# Patient Record
Sex: FEMALE | Race: WHITE | NOT HISPANIC OR LATINO | Employment: STUDENT | URBAN - METROPOLITAN AREA
[De-identification: names, ages, dates, MRNs, and addresses within clinical notes are randomized per-mention and may not be internally consistent; named-entity substitution may affect disease eponyms.]

---

## 2020-09-22 ENCOUNTER — TRANSFERRED RECORDS (OUTPATIENT)
Dept: HEALTH INFORMATION MANAGEMENT | Facility: CLINIC | Age: 35
End: 2020-09-22

## 2020-09-30 ENCOUNTER — TRANSFERRED RECORDS (OUTPATIENT)
Dept: HEALTH INFORMATION MANAGEMENT | Facility: CLINIC | Age: 35
End: 2020-09-30

## 2021-07-19 ENCOUNTER — MEDICAL CORRESPONDENCE (OUTPATIENT)
Dept: HEALTH INFORMATION MANAGEMENT | Facility: CLINIC | Age: 36
End: 2021-07-19

## 2021-07-19 ENCOUNTER — TRANSFERRED RECORDS (OUTPATIENT)
Dept: HEALTH INFORMATION MANAGEMENT | Facility: CLINIC | Age: 36
End: 2021-07-19

## 2021-07-20 ENCOUNTER — TRANSCRIBE ORDERS (OUTPATIENT)
Dept: OTHER | Age: 36
End: 2021-07-20

## 2021-07-20 DIAGNOSIS — K52.9 CHRONIC DIARRHEA: Primary | ICD-10-CM

## 2021-07-23 NOTE — TELEPHONE ENCOUNTER
REFERRAL INFORMATION:    Referring Provider:  JUNO Mello CNP     Referring Clinic:  [facility]     Reason for Visit/Diagnosis: Chronic diarrhea     FUTURE VISIT INFORMATION:    Appointment Date: 10/27/2021    Appointment Time: 8 AM      NOTES STATUS DETAILS   OFFICE NOTE from Referring Provider Received  9/17/2020 Office visit with Dr. Bea Waggoner (scanned [facility] 7/19/2021)     OFFICE NOTE from Other Specialist Received  9/30/2020 Office visit with RENU Marcano ([facility])   HOSPITAL DISCHARGE SUMMARY/  ED VISITS N/A    OPERATIVE REPORT N/A    MEDICATION LIST N/A         ENDOSCOPY  N/A    COLONOSCOPY N/A    ERCP N/A    EUS N/A    STOOL TESTING N/A      PERTINENT LABS Care Everywhere    PATHOLOGY REPORTS (RELATED) N/A    IMAGING (CT, MRI, EGD, MRCP, Small Bowel Follow Through/SBT, MR/CT Enterography) N/A

## 2021-10-27 ENCOUNTER — OFFICE VISIT (OUTPATIENT)
Dept: GASTROENTEROLOGY | Facility: CLINIC | Age: 36
End: 2021-10-27
Attending: NURSE PRACTITIONER
Payer: COMMERCIAL

## 2021-10-27 ENCOUNTER — PRE VISIT (OUTPATIENT)
Dept: GASTROENTEROLOGY | Facility: CLINIC | Age: 36
End: 2021-10-27

## 2021-10-27 VITALS
BODY MASS INDEX: 27.72 KG/M2 | SYSTOLIC BLOOD PRESSURE: 131 MMHG | HEART RATE: 68 BPM | OXYGEN SATURATION: 96 % | HEIGHT: 65 IN | WEIGHT: 166.4 LBS | DIASTOLIC BLOOD PRESSURE: 80 MMHG

## 2021-10-27 DIAGNOSIS — R12 HEARTBURN: ICD-10-CM

## 2021-10-27 DIAGNOSIS — K62.5 BRBPR (BRIGHT RED BLOOD PER RECTUM): ICD-10-CM

## 2021-10-27 DIAGNOSIS — R19.7 DIARRHEA, UNSPECIFIED TYPE: Primary | ICD-10-CM

## 2021-10-27 DIAGNOSIS — R63.4 WEIGHT LOSS: ICD-10-CM

## 2021-10-27 DIAGNOSIS — R13.19 ESOPHAGEAL DYSPHAGIA: ICD-10-CM

## 2021-10-27 PROCEDURE — 99205 OFFICE O/P NEW HI 60 MIN: CPT | Performed by: INTERNAL MEDICINE

## 2021-10-27 RX ORDER — FLUOXETINE 20 MG/1
30 TABLET, FILM COATED ORAL DAILY
COMMUNITY
Start: 2021-10-13 | End: 2022-11-09

## 2021-10-27 ASSESSMENT — MIFFLIN-ST. JEOR: SCORE: 1445.67

## 2021-10-27 NOTE — PROGRESS NOTES
GI CLINIC VISIT    CC/REFERRING MD:  Yara Barrera  REASON FOR CONSULTATION:   Ms. Grayson is a 36 year old female who I was asked to see in consultation at the request of Dr. Yara Barrera for   Chief Complaint   Patient presents with     New Patient       ASSESSMENT/PLAN:  (R19.7) Diarrhea, unspecified type  (primary encounter diagnosis)  (R63.4) Weight loss  (K62.5) BRBPR (bright red blood per rectum)  (R13.19) Esophageal dysphagia  (R12) Heartburn    New onset heartburn and solid food dysphagia in the setting of recent weight loss she does warrant endoscopic evaluation.  Would plan for biopsies for EOE.  Unclear if patient has had prior biopsies for celiac disease though recent serologies and reported past serologies have been negative.    Patient with concern for chronic diarrhea.  Consistency is not technically out of the normal realm the patient is having days of increased BMs (about 50% of days up to 3-4 BMs) as well as frequent urgency.  Also noting a vague sense of incomplete evacuation (in abdomen and rectum) does seem to resolve after multiple BMs later in the day.  Patient did have a 10 pound weight loss over 2 months though has stabilized over the past few weeks.  Had some bright red blood per rectum a few months ago which she felt was likely due to hemorrhoids given her past history of these.  Patient has increased fiber of this has not completely resolved her symptoms.  Plan for colonoscopy as well with with random colon biopsies for microscopic colitis in the setting of fluoxetine use.     In the meantime would continue with increased fiber intake-dietary and Metamucil.  Patient is interested in the with GI to dietitian be useful to discuss common food triggers for diarrhea such as caffeine and artificial and alcohol sugars.  Patient is also interested in discussion of low FODMAP diet in the setting of possible IBS/past diagnosis IBS.  Pending the above testing and clinical  "picture may consider breath testing down the road for SIBO and/or evaluation of the pelvic floor if there are issues with true rectal evacuation.    - EGD for dysphagia, new heartburn and recent weight loss  - Colonoscopy for diarrhea (microscopic colitis biopsies - on fluoxetine) and recent weight loss, BRBPR  - increasing complex carbs in diet, considering increase in Metamucil - ok to take up to 3 times daily--> all for stool bulking  - referral placed for GI dietitian  - follow-up in GI clinic 6-12 weeks with Dr. Moss and ALEXANDRIA Carter       Thank you for this consultation. It was a pleasure to participate in the care of this patient; please contact us with any further questions.  A total of 55 face-to-face minutes was spent with this patient, >50% of which was counseling regarding the above delineated issues. An additional 32 minutes was spent on the date of the encounter doing chart review, documentation, and further activities as noted above.    Beatriz Moss MD   of Medicine  Division of Gastroenterology, Hepatology and Nutrition  [facility]    ---------------------------------------------------------------------------------------------------  HPI:   Ms. Grayson is a 36 year old female who is s/p LEEP for abnormal PAP and s/p two spontaneous vaginal deliveries with PMDD and lactose intolerance who presents to GI clinic for evaluation of diarrhea and other GI symptoms.       Ms. Grayson reports that she's had \"GI issues\" for years. She states that as a kid she experienced \"stabbing\" epigastric pain which was only relieved with emesis. She was evaluated starting in the 8th grade and into early high school. She recalls having blood work done including an evaluation for celiac disease (all reportedly normal), a barium swallow (reportedly no evidence of ulceration or other abnormalities), and EGD (reportedly normal). She states she was eventually diagnosed with lactose " "intolerance. Apparently with discontinuation of high-volume milk intake her symptoms improved. It took her some time in high school to fully understand her dairy limits, but overall she felt much better in the ensuing years.     Years later she joined . During this time she started on a paleo diet and states she \"never felt better\" in terms of her stomach pains and bowel pattern. In 2015 she was stationed in [facility], [place]   and had to go off her diet and eat what was in cafeteria (packaged and processed foods per her report). During this entire time she experienced frequent looser stools and weight loss. Eventually she transitioned to eating only MREs (ready meals/rations). After this she reported marked improvement in her diarrhea, stating that MRAs are apparently known for leading to constipation.     Upon her return to the  she was evaluated for her diarrhea through the . She reports seeing a gastroenterologist and undergoing colonoscopy. This revealed a \"benign polyp\". She also reports seeing and allergist and that no food allergies identified. She doesn't fully recall the course of her symptoms upon her return, but feels diarrhea resolved rather quickly.     Ms. Grayson felt her bowel pattern was pretty good for several years. During that time her typical bowel pattern was daily; stool was formed but not fully solid - \"peanut butter\" to \"soft serv\" consistency. She did have infrequent urgency. During this time she tried to limit how much she ate out and limited processed foods and prepared meals. When she maintained a diet high in fruits, vegetables, and protein and \"more natural\" meals she states she felt much better. She wonders if she has an issue with a food preservative.    She did well until late Aug/Sept 2020 when she had acute onset of nausea vomiting diarrhea. Her  had the same symptoms they attributed it to food poisoning versus gastroenteritis. Her  symptoms resolved quickly " "though her seem to persist prompting her to seek care at [facility]. Work-up there included a negative stool enteric panel, giardia, tissue transglutaminase, O&P x3, and normal CBC. She was then referred to [facility]. Based on her clinical history and some improvement in her symptoms she was felt to have post-infectious flare of IBS on possible background of IBS and no further work-up was pursued at that time. She was started Metamucil and dicyclomine with consideration of endoscopic evaluation if her symptoms progress. Ms. Cruz felt her symptoms did get better for while.    She also reports a past history of hemorrhoids during pregnancy experienced as pain, itching, and small BRBPR.     Currently she does think her hemorrhoids flared a few months ago as she had recurrent pain, itching, and BRBPR. She increased her Metamucil and this improved.     In regards to her current bowel pattern she is passing one BM daily, typically in the morning.  BM is \"soft serv/peanut butter\" consistency and she wonders if this is normal.  She is most concerned though about frequent urgency which resolves after defecation.  About 3 to 4 days each week, particularly when she is in a hurry and not able to sit in the bathroom in the morning, she feels her evacuation is incomplete.  She has difficulty fully characterizing this sensation describes a sense of something being there in both the abdomen and the rectum.  This will lead to her having additional BMs with urgency throughout the day (up to 3).  This concerned her enough to schedule a visit with [facility] but unfortunately there were issues with getting seen prompting her to reach out to [facility] and obtain a referral to our clinic.    For treatment she is back on Metamucil and using this twice a day and has found this helped to some degree.     She [facility] (she is almost done with time as student and so transitioning away from " Bev).  [facility] signature obtained during this clinic visit) TTG and total IgA were done and normal. TSH 0.82, normal CBC.      ROS:    GI ROS:  Dysphagia: some sense of solid food sticking about once a week  Odynophagia: none  GERD symptoms: occasional heartburn experiences as burning in the chest or liquid up to the level of the throat  Nausea/vomiting: rare nausea associated with heartburn, no vomiting  Hematemesis/melena/hematochezia: none currently  Baseline stools: see HPI  Eructation: no change from baseline  Flatus: no change from baseline  Abdominal pain: not really an issue currently  Weight loss: reports a 10 lbs weight loss over two months, but no weight loss in the last month  NSAIDs: none  Oral steroids: none     Fevers/chills: none  Headache: none  Vision changes: none  Chest pain/pressure: none  Dyspnea: none  Skin changes/rashes: none  Lymphadenopathy: none  Myalgias/Arthralgias: none  Anxiety/depression: marked anxiety at start of pandemic - now improved, has diagnosis of PMDD after 2nd pregnancy and put on fluoxetine, went off for a period of time earlier this year and so went back on and feeling better.           PERTINENT PAST MEDICAL HISTORY:  Lactose intolerance  Hemorrhoids  S/p LEEP (2012) for abnormal PAP  PMDD  S/p two vaginal deliveries      PREVIOUS SURGERIES:  None      ALLERGIES:   Allergies   Allergen Reactions     Clarithromycin Hives     Other reaction(s): Urticaria  15yo         PERTINENT MEDICATIONS:  Current Outpatient Medications   Medication     FLUoxetine 20 MG tablet     Probiotic Product (PROBIOTIC PO)     Psyllium (METAMUCIL PO)     No current facility-administered medications for this visit.       SOCIAL HISTORY:  Social History     Socioeconomic History     Marital status:      Spouse name: Not on file     Number of children: Not on file     Years of education: Not on file     Highest education level: Not on file   Occupational History      "Not on file   Tobacco Use     Smoking status: Never Smoker     Smokeless tobacco: Never Used   Substance and Sexual Activity     Alcohol use: Not on file     Drug use: Not on file     Sexual activity: Not on file   Other Topics Concern     Not on file   Social History Narrative     Not on file     Social Determinants of Health     Financial Resource Strain:      Difficulty of Paying Living Expenses:    Food Insecurity:      Worried About Running Out of Food in the Last Year:      Ran Out of Food in the Last Year:    Transportation Needs:      Lack of Transportation (Medical):      Lack of Transportation (Non-Medical):    Physical Activity:      Days of Exercise per Week:      Minutes of Exercise per Session:    Stress:      Feeling of Stress :    Social Connections:      Frequency of Communication with Friends and Family:      Frequency of Social Gatherings with Friends and Family:      Attends [employer] Services:      Active Member of Clubs or Organizations:      Attends Club or Organization Meetings:      Marital Status:    Intimate Partner Violence:      Fear of Current or Ex-Partner:      Emotionally Abused:      Physically Abused:      Sexually Abused:        FAMILY HISTORY:  Father: diabetes, type 1  Maternal side: many with anxiety, depression  Half-sibling: cognitive disability  Half-sibling: neurologic disorder    Denies any known family history of GI malignancies.   Denies any known family history of GI illnesses.    PHYSICAL EXAMINATION:  Vitals /80   Pulse 68   Ht 1.651 m (5' 5\")   Wt 75.5 kg (166 lb 6.4 oz)   SpO2 96%   BMI 27.69 kg/m     Wt   Wt Readings from Last 2 Encounters:   10/27/21 75.5 kg (166 lb 6.4 oz)   Gen: Pt sitting up on exam table in NAD, interactive and cooperative on exam  Eyes: sclerae anicteric, no injection  Cardiac: RRR, nl S1, S2, no peripheral edema  Resp: Clear bilaterally  GI: Normoactive BS, abd soft, flat, essentially nontender  Skin: Warm, dry, no jaundice, nails " appear healthy  Neuro: alert, oriented, answers questions appropriately

## 2021-10-27 NOTE — NURSING NOTE
"Chief Complaint   Patient presents with     New Patient       Vitals:    10/27/21 0750   BP: 131/80   Pulse: 68   SpO2: 96%   Weight: 75.5 kg (166 lb 6.4 oz)   Height: 1.651 m (5' 5\")       Body mass index is 27.69 kg/m .    Radha Stoll CMA    "

## 2021-10-27 NOTE — PATIENT INSTRUCTIONS
- EGD for dysphagia, new heartburn and recent weight loss  - Colonoscopy for diarrhea (microscopic colitis biopsies - on fluoxetine) and recent weight loss, BRBPR  - increasing complex carbs in diet, considering increase in Metamucil - ok to take up to 3 times daily--> all for stool bulking  - referral placed for GI dietitian  - consider breath testing for SIBO, pending clinical picture  - follow-up in GI clinic 6-12 weeks with Dr. Moss and ALEXANDRIA Carter      If you have any questions, please don't hesitate to contact me through our GI RN Clinic Coordinator, Sally Amin, at [phone].

## 2021-10-28 ENCOUNTER — TELEPHONE (OUTPATIENT)
Dept: GASTROENTEROLOGY | Facility: CLINIC | Age: 36
End: 2021-10-28

## 2021-10-28 DIAGNOSIS — Z11.59 ENCOUNTER FOR SCREENING FOR OTHER VIRAL DISEASES: ICD-10-CM

## 2021-10-28 NOTE — TELEPHONE ENCOUNTER
Screening Questions  1. Are you active on mychart? N    2. What insurance is in the chart? [employer]    2.  Ordering/Referring Provider: Isa    3. BMI 27.5    4. Do you have any Lung issues?  N If yes continue:   Do you use daily home oxygen? NA   Do you have Pulmonary Hypertension? NA   Do you have SEVERE asthma? NA    5. Have you had a heart, lung, or liver transplant? N    6. Are you currently on dialysis or have chronic kidney disease? N    7. Have you had a stroke or Transient ischemic attack (TIA) within 6 months? N    8. In the past 6 months, have you had any heart related issues including cardiomyopathy or heart attack? N      If yes, did it require cardiac stenting or other implantable device?N      9. Do you have any implantable devices in your body (pacemaker, defib, LVAD)? N    10. Do you take nitroglycerin? If yes, how often? N    11. Are you currently taking any blood thinners?N    12. Are you a diabetic? N    13. (Females) Are you currently pregnant? N  If yes, how many weeks?      15. Are you taking any prescription pain medications on a routine schedule? N If yes, MAC sedation.    16. Do you have any chemical dependencies such as alcohol, street drugs, or methadone? N If yes, MAC sedation.    17. Do you have any history of post-traumatic stress syndrome, severe anxiety or history of psychosis? Moderate anxiety-takes meds    18. Do you transfer independently? Yes    19.  Do you have any issues with constipation? N    20. Preferred Pharmacy for Pre Prescription [id number] [place] [id number] AT [address] [place] [id number]    Scheduling Details    Which Colonoscopy Prep was Sent?: Miralax  Procedure Scheduled: Colon/Egd  Provider/Surgeon: Isa  Date of Procedure: 12/2/21  Location: [facility]  Caller (Please ask for phone number if not scheduled by patient): Elma      Sedation Type: MAC  Conscious Sedation- Needs  for 6 hours after the procedure  MAC/General-Needs   for 24 hours after procedure    Pre-op Required at [facility], [place], [place] and OR for MAC sedation:   (if yes advise patient they will need a pre-op prior to procedure)      Is patient on blood thinners? -N (If yes- inform patient to follow up with PCP or provider for follow up instructions)     Informed patient they will need an adult  Yes  Cannot take any type of public or medical transportation alone    [facility] 11/29/21    Confirmed Nurse will call to complete assessment Yes    Additional comments:

## 2021-10-29 ENCOUNTER — TELEPHONE (OUTPATIENT)
Dept: GASTROENTEROLOGY | Facility: CLINIC | Age: 36
End: 2021-10-29

## 2021-10-29 NOTE — TELEPHONE ENCOUNTER
Called pt to schedule a follow up with Leah Lee or Isa in person or virtual around 12/22. Also a video new appt with Tish Villarreal for next avail.

## 2021-11-18 ENCOUNTER — TELEPHONE (OUTPATIENT)
Dept: GASTROENTEROLOGY | Facility: CLINIC | Age: 36
End: 2021-11-18
Payer: COMMERCIAL

## 2021-11-18 NOTE — TELEPHONE ENCOUNTER
Patient scheduled for colonoscopy/EGD on 12.2.2021.     Covid test scheduled: 11.29.2021    Arrival time: 0630    Facility location: [facility]    Sedation type: MAC    Indication for procedure: dysphagia, heartburn, BRBPR, wt loss, diarrhea    Referring Provider: Yara Barrera NP    Bowel prep recommendation: miralax and magnesium citrate prep    Pre visit planning completed.    Lindsey Robert RN

## 2021-11-19 NOTE — TELEPHONE ENCOUNTER
Attempted to contact patient regarding upcoming colonoscopy/EGD procedure on 12.2.2021 for pre assessment questions. No answer.     Left message to return call to [phone] #2      Lindsey Robert RN

## 2021-11-22 NOTE — TELEPHONE ENCOUNTER
Patient returned call.     Pre assessment questions completed for upcoming colonoscopy/EGD procedure scheduled on 12/2/21.     COVID test scheduled 11/29/21    Reviewed procedural arrival time 0630 and facility location [facility].    Designated  policy reviewed. Instructed to have someone stay 24 hours post procedure.     Bowel prep recommendation: Miralax/Magnesium citrate/Dulcolax     Reviewed Miralax prep instructions with patient. No fiber/iron supplements or foods that contain nuts/seeds 7 days prior to procedure.     Anticoagulation/blood thinners? no    Electronic implanted devices? no    Patient verbalized understanding and had no questions or concerns at this time.    Lyric Roberts RN

## 2021-11-29 ENCOUNTER — LAB (OUTPATIENT)
Dept: LAB | Facility: CLINIC | Age: 36
End: 2021-11-29
Payer: COMMERCIAL

## 2021-11-29 DIAGNOSIS — Z11.59 ENCOUNTER FOR SCREENING FOR OTHER VIRAL DISEASES: ICD-10-CM

## 2021-11-29 LAB — SARS-COV-2 RNA RESP QL NAA+PROBE: NEGATIVE

## 2021-11-29 PROCEDURE — 99000 SPECIMEN HANDLING OFFICE-LAB: CPT | Performed by: PATHOLOGY

## 2021-11-29 PROCEDURE — U0003 INFECTIOUS AGENT DETECTION BY NUCLEIC ACID (DNA OR RNA); SEVERE ACUTE RESPIRATORY SYNDROME CORONAVIRUS 2 (SARS-COV-2) (CORONAVIRUS DISEASE [COVID-19]), AMPLIFIED PROBE TECHNIQUE, MAKING USE OF HIGH THROUGHPUT TECHNOLOGIES AS DESCRIBED BY CMS-2020-01-R: HCPCS | Mod: 90 | Performed by: PATHOLOGY

## 2021-11-29 PROCEDURE — U0005 INFEC AGEN DETEC AMPLI PROBE: HCPCS | Mod: 90 | Performed by: PATHOLOGY

## 2021-12-02 ENCOUNTER — DOCUMENTATION ONLY (OUTPATIENT)
Dept: GASTROENTEROLOGY | Facility: OUTPATIENT CENTER | Age: 36
End: 2021-12-02
Payer: COMMERCIAL

## 2021-12-02 ENCOUNTER — LAB REQUISITION (OUTPATIENT)
Dept: LAB | Facility: CLINIC | Age: 36
End: 2021-12-02
Payer: COMMERCIAL

## 2021-12-02 ENCOUNTER — TRANSFERRED RECORDS (OUTPATIENT)
Dept: HEALTH INFORMATION MANAGEMENT | Facility: CLINIC | Age: 36
End: 2021-12-02
Payer: COMMERCIAL

## 2021-12-02 ENCOUNTER — APPOINTMENT (OUTPATIENT)
Dept: GASTROENTEROLOGY | Facility: OUTPATIENT CENTER | Age: 36
End: 2021-12-02
Payer: COMMERCIAL

## 2021-12-02 PROCEDURE — 88305 TISSUE EXAM BY PATHOLOGIST: CPT | Mod: TC,ORL | Performed by: INTERNAL MEDICINE

## 2021-12-02 PROCEDURE — 88305 TISSUE EXAM BY PATHOLOGIST: CPT | Mod: 26 | Performed by: PATHOLOGY

## 2021-12-06 LAB
PATH REPORT.COMMENTS IMP SPEC: NORMAL
PATH REPORT.COMMENTS IMP SPEC: NORMAL
PATH REPORT.FINAL DX SPEC: NORMAL
PATH REPORT.GROSS SPEC: NORMAL
PATH REPORT.MICROSCOPIC SPEC OTHER STN: NORMAL
PATH REPORT.RELEVANT HX SPEC: NORMAL
PHOTO IMAGE: NORMAL

## 2021-12-12 ENCOUNTER — HEALTH MAINTENANCE LETTER (OUTPATIENT)
Age: 36
End: 2021-12-12

## 2022-05-19 ENCOUNTER — OFFICE VISIT (OUTPATIENT)
Dept: URGENT CARE | Facility: URGENT CARE | Age: 37
End: 2022-05-19
Payer: COMMERCIAL

## 2022-05-19 VITALS
BODY MASS INDEX: 26.09 KG/M2 | DIASTOLIC BLOOD PRESSURE: 80 MMHG | RESPIRATION RATE: 18 BRPM | TEMPERATURE: 98.7 F | OXYGEN SATURATION: 98 % | SYSTOLIC BLOOD PRESSURE: 121 MMHG | HEART RATE: 83 BPM | WEIGHT: 156.8 LBS

## 2022-05-19 DIAGNOSIS — H65.191 OTHER ACUTE NONSUPPURATIVE OTITIS MEDIA OF RIGHT EAR, RECURRENCE NOT SPECIFIED: Primary | ICD-10-CM

## 2022-05-19 PROCEDURE — 99203 OFFICE O/P NEW LOW 30 MIN: CPT | Performed by: FAMILY MEDICINE

## 2022-05-19 RX ORDER — AMOXICILLIN 500 MG/1
1000 TABLET, FILM COATED ORAL 2 TIMES DAILY
Qty: 40 TABLET | Refills: 0 | Status: SHIPPED | OUTPATIENT
Start: 2022-05-19 | End: 2022-05-29

## 2022-05-19 NOTE — PROGRESS NOTES
Chief complaint: ear discomfort    Everyone has cold x 2 weeks  Symptoms seem to be improving   But still has some plugging discomfort of both ears    Here to have her daughter checked, she would like her ears checked as well  Fever: No  Tried over the counter medications without relief  No fevers or chills chest pain or shortness of breath   No rash  Ill-contacts: no known covid exposure    Patient took a covid test earlier and was negative     Because of persistent and worsening symptoms came in to be seen    Problem list and histories reviewed & adjusted, as indicated.  Additional history: as documented    Problem list, Medication list, Allergies, and Medical/Social/Surgical histories reviewed in [facility] and updated as appropriate.    ROS:  Constitutional, HEENT, cardiovascular, pulmonary, gi and gu systems are negative, except as otherwise noted.    OBJECTIVE:                                                    /80   Pulse 83   Temp 98.7  F (37.1  C) (Tympanic)   Resp 18   Wt 71.1 kg (156 lb 12.8 oz)   SpO2 98%   BMI 26.09 kg/m    Body mass index is 26.09 kg/m .  GENERAL: healthy, alert and no distress  EYES: pink palpebral conjunctiva, anicteric sclera, pupils equally reactive to light and accomodation, extraocular muscles intact full and equal.  ENT: midline nasal septum, mild nasal congestion   Left ear:no tragal tenderness, no mastoid tenderness normal tympaninc membrane   Right ear: no tragal tenderness, no mastoid tenderness dull and erythematous tympaninc membrane   NECK: no adenopathy, no asymmetry or  masses  RESP: lungs clear to auscultation - no rales, rhonchi or wheezes  CV: regular rate and rhythm, normal S1 S2, no S3 or S4, no murmur, click or rub, no peripheral edema and peripheral pulses strong  MS: no gross musculoskeletal defects noted, no edema  NEURO: Normal strength and tone, mentation intact and speech normal    Diagnostic Test Results:  No results found for this or any previous  visit (from the past 24 hour(s)).     ASSESSMENT/PLAN:                                                        ICD-10-CM    1. Other acute nonsuppurative otitis media of right ear, recurrence not specified  H65.191 amoxicillin (AMOXIL) 500 MG tablet       Prescribed with amoxicillin   Recommend follow up with primary care provider if no relief in 3 days, sooner if worse  Adverse reactions of medications discussed.  Over the counter medications discussed.   Aware to come back in if with worsening symptoms or if no relief despite treatment plan  Patient voiced understanding and had no further questions.     MD Dai Fulton MD  [facility] [place]       [facility]

## 2022-08-09 ENCOUNTER — OFFICE VISIT (OUTPATIENT)
Dept: URGENT CARE | Facility: URGENT CARE | Age: 37
End: 2022-08-09
Payer: COMMERCIAL

## 2022-08-09 VITALS
RESPIRATION RATE: 16 BRPM | TEMPERATURE: 97.9 F | HEART RATE: 84 BPM | OXYGEN SATURATION: 100 % | SYSTOLIC BLOOD PRESSURE: 122 MMHG | DIASTOLIC BLOOD PRESSURE: 83 MMHG

## 2022-08-09 DIAGNOSIS — R07.9 CHEST PAIN, UNSPECIFIED TYPE: Primary | ICD-10-CM

## 2022-08-09 DIAGNOSIS — U07.1 INFECTION DUE TO 2019 NOVEL CORONAVIRUS: ICD-10-CM

## 2022-08-09 DIAGNOSIS — J34.89 SINUS PRESSURE: ICD-10-CM

## 2022-08-09 PROCEDURE — 99214 OFFICE O/P EST MOD 30 MIN: CPT | Mod: CS | Performed by: NURSE PRACTITIONER

## 2022-08-09 PROCEDURE — 93000 ELECTROCARDIOGRAM COMPLETE: CPT | Performed by: NURSE PRACTITIONER

## 2022-08-09 ASSESSMENT — PAIN SCALES - GENERAL: PAINLEVEL: SEVERE PAIN (6)

## 2022-08-09 NOTE — PROGRESS NOTES
Assessment & Plan     Chest pain, unspecified type    - EKG 12-lead complete w/read - Clinics    Infection due to 2019 novel coronavirus    Sinus pressure      Reviewed EKG completed during visit showing rate 76 sinus rhythm, without obvious ST elevation or depression. Recommend further evaluation in emergency room for new onset left-sided chest pain with recent COVID infection for cardiac workup. Patient agreeable and declines ambulance and is discharged in stable condition.       Sanna Hernandez NP  [facility]        Laurie Wills is a 37 year old female who presents to clinic today for the following health issues:  Chief Complaint   Patient presents with     Urgent Care     Covid Concern     Per pt states she tested positive for Covid on 07/31/2022 home test the first two days she had sore throat and fever and today having facial pain states it feels burning, productive mucus      Patient presents for evaluation of sinus pain/pressure. Associated symptoms: headache, runny nose. Symptoms started 7/31/22 and have been waxing and waning. She developed left sided chest pain/heaviness which started this afternoon. Pain is moderate. Denies shortness of breath. She had sore throat, feeling warm, chills with COVID which resolved.  She tired tylenol and Mucinex which helps temporarily.     Problem list, Medication list, Allergies, and Medical history reviewed in EPIC.    ROS:  Review of systems negative except for noted above        Objective    /83   Pulse 84   Temp 97.9  F (36.6  C) (Tympanic)   Resp 16   SpO2 100%   Physical Exam  Constitutional:       General: She is not in acute distress.     Appearance: She is not toxic-appearing or diaphoretic.   HENT:      Head: Normocephalic and atraumatic.      Right Ear: Tympanic membrane, ear canal and external ear normal.      Left Ear: Tympanic membrane, ear canal and external ear normal.      Nose:      Right Sinus: Maxillary  sinus tenderness present. No frontal sinus tenderness.      Left Sinus: No maxillary sinus tenderness or frontal sinus tenderness.      Comments: Moderate nasal congestion     Mouth/Throat:      Mouth: Mucous membranes are moist.      Pharynx: Oropharynx is clear. No oropharyngeal exudate or posterior oropharyngeal erythema.   Eyes:      Conjunctiva/sclera: Conjunctivae normal.   Cardiovascular:      Rate and Rhythm: Normal rate and regular rhythm.      Heart sounds: Normal heart sounds.   Pulmonary:      Effort: Pulmonary effort is normal. No respiratory distress.      Breath sounds: Normal breath sounds. No wheezing, rhonchi or rales.   Chest:      Chest wall: No tenderness.   Skin:     General: Skin is warm and dry.   Neurological:      General: No focal deficit present.      Mental Status: She is alert and oriented to person, place, and time.      Sensory: No sensory deficit.          EKG completed during visit showing rate 76 sinus rhythm, without obvious ST elevation or depression.

## 2022-10-03 ENCOUNTER — HEALTH MAINTENANCE LETTER (OUTPATIENT)
Age: 37
End: 2022-10-03

## 2022-11-09 ENCOUNTER — OFFICE VISIT (OUTPATIENT)
Dept: URGENT CARE | Facility: URGENT CARE | Age: 37
End: 2022-11-09
Payer: COMMERCIAL

## 2022-11-09 VITALS
OXYGEN SATURATION: 97 % | WEIGHT: 154 LBS | HEART RATE: 96 BPM | DIASTOLIC BLOOD PRESSURE: 85 MMHG | SYSTOLIC BLOOD PRESSURE: 125 MMHG | BODY MASS INDEX: 25.63 KG/M2 | TEMPERATURE: 99.2 F

## 2022-11-09 DIAGNOSIS — R05.1 ACUTE COUGH: Primary | ICD-10-CM

## 2022-11-09 PROCEDURE — 99213 OFFICE O/P EST LOW 20 MIN: CPT | Performed by: INTERNAL MEDICINE

## 2022-11-09 RX ORDER — ALBUTEROL SULFATE 90 UG/1
2 AEROSOL, METERED RESPIRATORY (INHALATION) EVERY 4 HOURS PRN
Qty: 18 G | Refills: 0 | Status: SHIPPED | OUTPATIENT
Start: 2022-11-09

## 2022-11-09 RX ORDER — BENZONATATE 100 MG/1
100 CAPSULE ORAL 3 TIMES DAILY PRN
Qty: 30 CAPSULE | Refills: 0 | Status: SHIPPED | OUTPATIENT
Start: 2022-11-09

## 2022-11-09 RX ORDER — FLUOXETINE 20 MG/1
40 TABLET, FILM COATED ORAL DAILY
COMMUNITY
Start: 2022-08-30

## 2022-11-09 RX ORDER — BUSPIRONE HYDROCHLORIDE 5 MG/1
5 TABLET ORAL 3 TIMES DAILY
COMMUNITY
Start: 2022-08-30 | End: 2023-08-30

## 2022-11-09 NOTE — PROGRESS NOTES
SUBJECTIVE:  Elma Grayson is an 37 year old female who presents for cough and runny nose.  Started four days ago.  Some ear pain.  Mild sore throat. Eating less than usual.  Ibuprofen and tylenol and mucinex which help some.  Cough is not productive.  Has some chills and sweats.  Vomited once a couple days ago. Neg home covid test.    PMH:  Anxiety, depression, ibs, childhood asthma.  There is no problem list on file for this patient.    Social History     Socioeconomic History     Marital status:      Spouse name: None     Number of children: None     Years of education: None     Highest education level: None   Tobacco Use     Smoking status: Never     Smokeless tobacco: Never     No family history on file.    ALLERGIES:  Clarithromycin    Current Outpatient Medications   Medication     busPIRone (BUSPAR) 5 MG tablet     FLUoxetine 20 MG tablet     No current facility-administered medications for this visit.         ROS:  ROS is done and is negative for general/constitutional, eye, ENT, Respiratory, cardiovascular, GI, , Skin, musculoskeletal except as noted elsewhere.  All other review of systems negative except as noted elsewhere.      OBJECTIVE:  /85   Pulse 96   Temp 99.2  F (37.3  C) (Tympanic)   Wt 69.9 kg (154 lb)   LMP  (LMP Unknown)   SpO2 97%   BMI 25.63 kg/m    GENERAL APPEARANCE: Alert, in no acute distress  EYES: normal  EARS: External ears normal. Canals clear. TMs with mild effusions no erythema.  NOSE:mild clear discharge  OROPHARYNX:normal  NECK:No adenopathy,masses or thyromegaly  RESP:periodic cough, clear to auscultation  CV:regular rate and rhythm and no murmurs, clicks, or gallops  ABDOMEN: Abdomen soft, non-tender. BS normal. No masses, organomegaly  SKIN: no ulcers, lesions or rash  MUSCULOSKELETAL:Musculoskeletal normal      RESULTS  No results found for any visits on 11/09/22..  No results found for this or any previous visit (from the past 48  hour(s)).    ASSESSMENT/PLAN:    ASSESSMENT / PLAN:  (R05.1) Acute cough  (primary encounter diagnosis)  Comment: currently c/w viral etiology.  rxs for meds to help with cough sxs.  Plan: albuterol (PROAIR HFA/PROVENTIL HFA/VENTOLIN         HFA) 108 (90 Base) MCG/ACT inhaler, benzonatate        (TESSALON) 100 MG capsule        Reviewed medication instructions and side effects. Follow up if experiences side effects.. I reviewed supportive care, otc meds to use if needed, expected course, and signs of concern.  Follow up as needed or if she does not improve within 5 day(s) or if worsens in any way.  Reviewed red flag symptoms and is to go to the ER if experiences any of these.      See [facility] for orders, medications, letters, patient instructions    Sue Tavares M.D.

## 2022-12-02 ENCOUNTER — MYC MEDICAL ADVICE (OUTPATIENT)
Dept: GASTROENTEROLOGY | Facility: CLINIC | Age: 37
End: 2022-12-02

## 2022-12-07 NOTE — TELEPHONE ENCOUNTER
Called to remind patient of their upcoming appointment with our GI clinic, on Wednesday 12/14/2022 at 8:45AM with Dr. Moss. This appointment is scheduled as an in-person appt. Please arrive 15 minutes early to check in for your appointment. , if your appointment is virtual (video or telephone) you need to be in [place] for the visit. To reschedule or cancel patient to call [phone].    Valeria Leon MA

## 2022-12-14 ENCOUNTER — OFFICE VISIT (OUTPATIENT)
Dept: GASTROENTEROLOGY | Facility: CLINIC | Age: 37
End: 2022-12-14
Payer: COMMERCIAL

## 2022-12-14 VITALS
HEIGHT: 65 IN | DIASTOLIC BLOOD PRESSURE: 84 MMHG | WEIGHT: 160.7 LBS | OXYGEN SATURATION: 97 % | HEART RATE: 82 BPM | SYSTOLIC BLOOD PRESSURE: 126 MMHG | BODY MASS INDEX: 26.77 KG/M2

## 2022-12-14 DIAGNOSIS — K21.9 GASTROESOPHAGEAL REFLUX DISEASE WITHOUT ESOPHAGITIS: ICD-10-CM

## 2022-12-14 DIAGNOSIS — K58.0 IRRITABLE BOWEL SYNDROME WITH DIARRHEA: Primary | ICD-10-CM

## 2022-12-14 DIAGNOSIS — E73.9 LACTOSE INTOLERANCE: ICD-10-CM

## 2022-12-14 PROCEDURE — 99215 OFFICE O/P EST HI 40 MIN: CPT | Performed by: INTERNAL MEDICINE

## 2022-12-14 RX ORDER — FAMOTIDINE 20 MG/1
20 TABLET, FILM COATED ORAL 2 TIMES DAILY
Qty: 60 TABLET | Refills: 3 | Status: SHIPPED | OUTPATIENT
Start: 2022-12-14

## 2022-12-14 ASSESSMENT — PAIN SCALES - GENERAL: PAINLEVEL: NO PAIN (1)

## 2022-12-14 NOTE — PROGRESS NOTES
"Chief Complaint   Patient presents with     Follow Up       Vitals:    12/14/22 0859   BP: 126/84   BP Location: Left arm   Cuff Size: Adult Regular   Pulse: 82   SpO2: 97%   Weight: 72.9 kg (160 lb 11.2 oz)   Height: 1.651 m (5' 5\")       Body mass index is 26.74 kg/m .    Corby Marie  -----------------------------------    GI CLINIC VISIT    CC: follow-up      ASSESSMENT/PLAN:  (K58.0) Irritable bowel syndrome with diarrhea  (primary encounter diagnosis)  (K21.9) Gastroesophageal reflux disease without esophagitis  (E73.9) Lactose intolerance       Mild heartburn symptoms and sense of food sticking. Reported past hx of erosive esophagitis. Recent EGD endoscopically normal with only mild inflammation on distal esophagus biopsies. Discussed trial of H2 blocker. Med discussed.      Intermittent periods of loose stools with some symptom-free periods. Normal EGD and colon with normal biopsies. Normal TTG, CBC, TSH. Negative stool enteric panel, giardia, and O&P.  Known background of lactose intolerance with worsened symptoms with lactose-ingestion and with increased coffee intake. Certainly IBS can be playing a role as well. Pt does feel Buspar has been helpful particularly for keeping momentum for her to make dietary and lifestyle changes and keep up with appointments. Reviewed some adjustments to her meds and diet as outlined below as well as visit with our dietitian.     - please cut out all artificial sugars from your diet: Aspartame, Sucralose, Acesulfame K, Saccharin, Xylitol.  - continue to avoid lactose as you are  - consider having Lactaid with you and using before eating out or with [place]  - Increase Metamucil to daily use  - schedule a visit with our GI dietitian at your earliest convenience  - Increase Buspar to 10 mg twice daily, hopefully this will help with scheduling/remembering to take it during your busy day  - Consider taking loperamide/Immodium prophylactically if eating out, traveling, etc.  - " "consider cutting back on caffeine to 1-2 cups a day  - start famotidine 20 mg BID  - follow-up in 12-16 weeks    It was a pleasure to participate in the care of this patient; please contact us with any further questions.  A total of 35 face-to-face minutes was spent with this patient, >50% of which was counseling regarding the above delineated issues. An additional 30 minutes was spent on the date of the encounter doing chart review, documentation, care coordination, and further activities as noted above.    Beatriz Moss MD   of Medicine  Division of Gastroenterology, Hepatology and Nutrition  [facility]    ---------------------------------------------------------------------------------------------------  HPI:    Ms. Grayson is a 37 year old female who is s/p LEEP for abnormal PAP and s/p two spontaneous vaginal deliveries with PMDD and lactose intolerance who initially presented to GI clinic on 10/27/21 for evaluation of diarrhea and other GI symptoms. This is her first return visit.        ** Diarrhea  ** Lactose-intolerance  Taken/summarized from my earlier documentation:  Ms. Grayson reports that she's had \"GI issues\" for years. She states that as a kid she experienced \"stabbing\" epigastric pain which was only relieved with emesis. She was evaluated starting in the 8th grade and into early high school. She recalls having blood work done including an evaluation for celiac disease (all reportedly normal), a barium swallow (reportedly no evidence of ulceration or other abnormalities), and EGD. She states she was eventually diagnosed with lactose intolerance. Apparently with discontinuation of high-volume milk intake her symptoms improved. It took her some time in high school to fully understand her dairy limits, but overall she felt much better in the ensuing years.      Years later she joined . During this time she started on a paleo diet and states she \"never felt " "better\" in terms of her stomach pains and bowel pattern. In 2015 she was stationed in [facility] and had to go off her diet and eat what was in cafeteria (packaged and processed foods per her report). During this entire time she experienced frequent looser stools and weight loss. Eventually she transitioned to eating only MREs (ready meals/rations). After this she reported marked improvement in her diarrhea, stating that these meals are apparently known for leading to constipation.      Upon her return to the  she was evaluated for her diarrhea through the . She reports seeing a gastroenterologist and undergoing colonoscopy. This revealed a \"benign polyp\". She also reports seeing and allergist and that no food allergies identified. She doesn't fully recall the course of her symptoms upon her return, but feels diarrhea resolved rather quickly.      Ms. Grayson felt her bowel pattern was pretty good for several years. During that time her typical bowel pattern was daily; stool was formed but not fully solid - \"peanut butter\" to \"soft serv\" consistency. She did have infrequent urgency. During this time she tried to limit how much she ate out and limited processed foods and prepared meals. When she maintained a diet high in fruits, vegetables, and protein and \"more natural\" meals she states she felt much better. She wonders if she has an issue with a food preservative.    She did well until late Aug/Sept 2020 when she had acute onset of nausea, vomiting, and diarrhea. Her  had the same symptoms they attributed it to food poisoning versus gastroenteritis. Her  symptoms resolved quickly though her seem to persist prompting her to seek care at [facility]. Work-up there included a negative stool enteric panel, giardia, tissue transglutaminase, O&P x3, and normal CBC. She was then referred to [facility]. 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 She was started Metamucil and dicyclomine with consideration of endoscopic evaluation if her symptoms progress. Ms. Cruz felt her symptoms did get better for while.     At the time of her first visit in our clinic, her bowel pattern was one BM daily, typically in the morning.  BM was \"soft serv/peanut butter\" consistency and she wonders if this is normal.  She is most concerned though about frequent urgency which resolves after defecation.  About 3 to 4 days each week, particularly when she is in a hurry and not able to sit in the bathroom in the morning, she feels her evacuation is incomplete.  She has difficulty fully characterizing this sensation describes a sense of something being there in both the abdomen and the rectum.  This will lead to her having additional BMs with urgency throughout the day (up to 3).  This concerned her enough to schedule a visit with [facility] but unfortunately there were issues with getting seen prompting her to reach out to [facility] and obtain a referral to our clinic.     She re-initiated Metamucil BID at that time and found this helped to some degree. Outside labs done through her PCP at that time revealed normal TTG and total IgA. TSH 0.82, normal CBC.    After her last visit, we pursued an EGD and colonoscopy. EGD was endoscopically normal. Biopsies of the esophagus were NOT consistent with EoE, but mild inflammation was seen on the distal biopsies. Gastric biopsies were normal. Duodenal biopsies had some peptic duodenitis, but no evidence of celiac disease. Colonoscopy to the  was normal. Random right and left colon biopsies were normal.     At that time, Ms. Grayson reports she was busy and not able to follow-up or meet with our GI dietitian. Overall she felt her stool patterns was pretty good for awhile. Unfortunately things worsened and she was worried about how much she was " missing work to use the bathroom. Stools were Don[place]han 5-6 with urgency, going 8-12 times a day.  These 8-12 stools were passed as three or four clusters of a few BMs at a time. She made plans to follow-up in our clinic and was seen by her PCP.      She maintained daily Metamucil intake and abstained from coffee ingestion. Overtime she made other adjustment including drinking Kombucha. By Aug 2022 her PCP had started her on Buspar 5 mg TID (primarily for GI symptoms, but also noting impact on anxiousness). Ms. Grayson improved and was only having 1-2 BMs a day which were Doniphan 4 during this time. She was not taking any antidiarrheals.     After feeling well for a while, she re-introduced coffee, and BMs increased - up to 4-5 times a day. She has less urgency than at her worst, though stool is now Doniphan 5ish. She has noted some triggers, for example, if she has Morovis coffee drink and a breakfast sandwich she will have more symptoms, than if had neither or just the coffee. She also adds that she and her  are cooking more at home and eating less packaged food.     Diet Recall for common diarrheal triggers:  Caffeine: when off of coffee was on energy drinks, when feeling well would have up to a pot of coffee a day, now is having about a half a pot of coffee, 1-2 times a week will get Morovis  Artificial sugars/alcohol sugars: doesn't think uses much but hasn't checked labels at home, uses honey to briana coffee  Alcohol: none  Lactose: known intolerance to lactose since childhood, limited ingestion, generally hard cheeses for dairy, does not use Lactaid (has in the past), when doing well/feeling well - she will allow herself some lactose (I.e ice cream)    Current meds:  - Metamucil dose - no longer using daily, maybe 1-2 times a week. 2 tablespoons at a time  -Buspar - currently planned to stay at that dosing, feels really helpful for anxiety. On 5 mg TID - per her intended for GI symptoms. Does have a  "harder time taking it TID. Still on fluoxetine  - Loperamide, restarted after reintroduced coffee and stools got looser. Maybe using once a week. Will take 2 tabs, if still loose stools will another one.       **BRBPR  Taken/summarized from my earlier documentation:  She reports a past history of hemorrhoids during pregnancy experienced as pain, itching, and small BRBPR. At her initial GI visit she had reported more recent perianal pain, itching, and intermittent BRBPR. She felt this may be related to hemorrhoids and increased Metamucil with improvement.     As noted above, a colonoscopy was undertaken in Dec 2021. No source of BRBPR was seen on the exam.      Today, this is not an acute issue and she hasn't had these symptoms for some time.     ** Concern for GERD  At her initial GI visit, Ms. Grayson had described a new onset of solid food dysphagia and heartburn. EGD in Dec 2021 was endoscopically normal. Biopsies were NOT consistent with eosinophilic esophagitis, though a small amount of inflammation was seen on the distal esophagus biopsies suggestive of mild reflux.     Today, Ms. Grayson feels this is doing ok. She still has some periods of heartburn, but this hasn't been a significant issue. She continues to note a mild sense of food sticking and wonders if this is mostly sensed when she is paying attention to her swallow or eating too fast. She hasn't been taking anything for heartburn on a regular basis. She recalls a finding of \"erosive esophagitis\" on an EGD done years ago.    GI ROS:  No overt GI bleeding, weight is stable - no weight loss.     PERTINENT MEDICATIONS:  Current Outpatient Medications   Medication     albuterol (PROAIR HFA/PROVENTIL HFA/VENTOLIN HFA) 108 (90 Base) MCG/ACT inhaler     benzonatate (TESSALON) 100 MG capsule     busPIRone (BUSPAR) 5 MG tablet     FLUoxetine 20 MG tablet     No current facility-administered medications for this visit.         PHYSICAL EXAMINATION:  Vitals BP " "126/84 (BP Location: Left arm, Cuff Size: Adult Regular)   Pulse 82   Ht 1.651 m (5' 5\")   Wt 72.9 kg (160 lb 11.2 oz)   LMP  (LMP Unknown)   SpO2 97%   BMI 26.74 kg/m     Wt   Wt Readings from Last 2 Encounters:   12/14/22 72.9 kg (160 lb 11.2 oz)   11/09/22 69.9 kg (154 lb)      Gen: Pt sitting up on exam table in NAD, interactive and cooperative on exam  Eyes: sclerae anicteric, no injection  Cardiac: RRR, nl S1, S2, no peripheral edema  Resp: Clear b/l  GI: Normoactive BS, abd soft, flat, nontender  Skin: Warm, dry, no jaundice, nails appear healthy  Neuro: alert, oriented, answers questions appropriately      PERTINENT STUDIES:    Lab Requisition on 12/02/2021   Component Date Value Ref Range Status     Case Report 12/02/2021    Final                    Value:Surgical Pathology Report                         Case: [id number]                                  Authorizing Provider:  Beatriz Moss         Collected:           12/02/2021 07:30 AM                                 MD Vianey                                                                Ordering Location:     [facility]     Received:            12/02/2021 02:41 PM                                 [facility]                                                       Pathologist:           Tyrel Domingo MD                                                             Specimens:   A) - Small Intestine, Duodenum, Duodenal biopsy                                                     B) - Stomach, Gastric biopsy                                                                        C) - Esophagus, Distal, Esophageal biopsy                                                           D) - Esophagus, Mid, Esophageal biopsy                                                                                        E) - Large Intestine, Colon, Ascending, Colon biopsy, ascending                                     F) - Large Intestine, Colon, " Descending, Colon biopsy, descending                           Final Diagnosis 12/02/2021    Final                    Value:This result contains rich text formatting which cannot be displayed here.     Clinical Information 12/02/2021    Final                    Value:This result contains rich text formatting which cannot be displayed here.     Gross Description 12/02/2021    Final                    Value:This result contains rich text formatting which cannot be displayed here.     Microscopic Description 12/02/2021    Final                    Value:This result contains rich text formatting which cannot be displayed here.     Performing Labs 12/02/2021    Final                    Value:This result contains rich text formatting which cannot be displayed here.

## 2022-12-14 NOTE — LETTER
"    12/14/2022         RE: Elma Grayson  [address] [place] [address]        Dear Colleague,    Thank you for referring your patient, Elma Grayson, to the [facility] [place]. Please see a copy of my visit note below.    Chief Complaint   Patient presents with     Follow Up       Vitals:    12/14/22 0859   BP: 126/84   BP Location: Left arm   Cuff Size: Adult Regular   Pulse: 82   SpO2: 97%   Weight: 72.9 kg (160 lb 11.2 oz)   Height: 1.651 m (5' 5\")       Body mass index is 26.74 kg/m .    Corby Marie  -----------------------------------    GI CLINIC VISIT    CC: follow-up      ASSESSMENT/PLAN:  (K58.0) Irritable bowel syndrome with diarrhea  (primary encounter diagnosis)  (K21.9) Gastroesophageal reflux disease without esophagitis  (E73.9) Lactose intolerance       Mild heartburn symptoms and sense of food sticking. Reported past hx of erosive esophagitis. Recent EGD endoscopically normal with only mild inflammation on distal esophagus biopsies. Discussed trial of H2 blocker. Med discussed.      Intermittent periods of loose stools with some symptom-free periods. Normal EGD and colon with normal biopsies. Normal TTG, CBC, TSH. Negative stool enteric panel, giardia, and O&P.  Known background of lactose intolerance with worsened symptoms with lactose-ingestion and with increased coffee intake. Certainly IBS can be playing a role as well. Pt does feel Buspar has been helpful particularly for keeping momentum for her to make dietary and lifestyle changes and keep up with appointments. Reviewed some adjustments to her meds and diet as outlined below as well as visit with our dietitian.     - please cut out all artificial sugars from your diet: Aspartame, Sucralose, Acesulfame K, Saccharin, Xylitol.  - continue to avoid lactose as you are  - consider having Lactaid with you and using before eating out or with [place]  - Increase Metamucil to daily use  - schedule a visit with " "our GI dietitian at your earliest convenience  - Increase Buspar to 10 mg twice daily, hopefully this will help with scheduling/remembering to take it during your busy day  - Consider taking loperamide/Immodium prophylactically if eating out, traveling, etc.  - consider cutting back on caffeine to 1-2 cups a day  - start famotidine 20 mg BID  - follow-up in 12-16 weeks    It was a pleasure to participate in the care of this patient; please contact us with any further questions.  A total of 35 face-to-face minutes was spent with this patient, >50% of which was counseling regarding the above delineated issues. An additional 30 minutes was spent on the date of the encounter doing chart review, documentation, care coordination, and further activities as noted above.    Beatriz Moss MD   of Medicine  Division of Gastroenterology, Hepatology and Nutrition  [facility]    ---------------------------------------------------------------------------------------------------  HPI:    Ms. Grayson is a 37 year old female who is s/p LEEP for abnormal PAP and s/p two spontaneous vaginal deliveries with PMDD and lactose intolerance who initially presented to GI clinic on 10/27/21 for evaluation of diarrhea and other GI symptoms. This is her first return visit.        ** Diarrhea  ** Lactose-intolerance  Taken/summarized from my earlier documentation:  Ms. Grayson reports that she's had \"GI issues\" for years. She states that as a kid she experienced \"stabbing\" epigastric pain which was only relieved with emesis. She was evaluated starting in the 8th grade and into early high school. She recalls having blood work done including an evaluation for celiac disease (all reportedly normal), a barium swallow (reportedly no evidence of ulceration or other abnormalities), and EGD. She states she was eventually diagnosed with lactose intolerance. Apparently with discontinuation of high-volume milk intake her " "symptoms improved. It took her some time in high school to fully understand her dairy limits, but overall she felt much better in the ensuing years.      Years later she joined . During this time she started on a paleo diet and states she \"never felt better\" in terms of her stomach pains and bowel pattern. In 2015 she was stationed in [facility] and had to go off her diet and eat what was in cafeteria (packaged and processed foods per her report). During this entire time she experienced frequent looser stools and weight loss. Eventually she transitioned to eating only MREs (ready meals/rations). After this she reported marked improvement in her diarrhea, stating that these meals are apparently known for leading to constipation.      [place] to the [place] she was evaluated for her diarrhea through the . She reports seeing a gastroenterologist and undergoing colonoscopy. This revealed a \"benign polyp\". She also reports seeing and allergist and that no food allergies identified. She doesn't fully recall the course of her symptoms upon her return, but feels diarrhea resolved rather quickly.      Ms. Grayson felt her bowel pattern was pretty good for several years. During that time her typical bowel pattern was daily; stool was formed but not fully solid - \"peanut butter\" to \"soft serv\" consistency. She did have infrequent urgency. During this time she tried to limit how much she ate out and limited processed foods and prepared meals. When she maintained a diet high in fruits, vegetables, and protein and \"more natural\" meals she states she felt much better. She wonders if she has an issue with a food preservative.    She did well until late Aug/Sept 2020 when she had acute onset of nausea, vomiting, and diarrhea. Her  had the same symptoms they attributed it to food poisoning versus gastroenteritis. Her  symptoms resolved quickly though her seem to persist prompting her to seek care at " "[facility]. Work-up there included a negative stool enteric panel, giardia, tissue transglutaminase, O&P x3, and normal CBC. She was then referred to [facility]. Based on her clinical history and some improvement in her symptoms she was felt to have post-infectious flare of IBS on possible background of IBS and no further work-up was pursued at that time. She was started Metamucil and dicyclomine with consideration of endoscopic evaluation if her symptoms progress. Ms. Cruz felt her symptoms did get better for while.     At the time of her first visit in our clinic, her bowel pattern was one BM daily, typically in the morning.  BM was \"soft serv/peanut butter\" consistency and she wonders if this is normal.  She is most concerned though about frequent urgency which resolves after defecation.  About 3 to 4 days each week, particularly when she is in a hurry and not able to sit in the bathroom in the morning, she feels her evacuation is incomplete.  She has difficulty fully characterizing this sensation describes a sense of something being there in both the abdomen and the rectum.  This will lead to her having additional BMs with urgency throughout the day (up to 3).  This concerned her enough to schedule a visit with [facility] but unfortunately there were issues with getting seen prompting her to reach out to [facility] and obtain a referral to our clinic.     She re-initiated Metamucil BID at that time and found this helped to some degree. Outside labs done through her PCP at that time revealed normal TTG and total IgA. TSH 0.82, normal CBC.    After her last visit, we pursued an EGD and colonoscopy. EGD was endoscopically normal. Biopsies of the esophagus were NOT consistent with EoE, but mild inflammation was seen on the distal biopsies. Gastric biopsies were normal. Duodenal biopsies had some peptic duodenitis, but no evidence of celiac disease. Colonoscopy to the  was normal. Random right and left colon " biopsies were normal.     At that time, Ms. Grayson reports she was busy and not able to follow-up or meet with our GI dietitian. Overall she felt her stool patterns was pretty good for awhile. Unfortunately things worsened and she was worried about how much she was missing work to use the bathroom. Stools were [place] 5-6 with urgency, going 8-12 times a day.  These 8-12 stools were passed as three or four clusters of a few BMs at a time. She made plans to follow-up in our clinic and was seen by her PCP.      She maintained daily Metamucil intake and abstained from coffee ingestion. Overtime she made other adjustment including drinking Kombucha. By Aug 2022 her PCP had started her on Buspar 5 mg TID (primarily for GI symptoms, but also noting impact on anxiousness). Ms. Grayson improved and was only having 1-2 BMs a day which were [place] 4 during this time. She was not taking any antidiarrheals.     After feeling well for a while, she re-introduced coffee, and BMs increased - up to 4-5 times a day. She has less urgency than at her worst, though stool is now [place] 5ish. She has noted some triggers, for example, if she has [place] coffee drink and a breakfast sandwich she will have more symptoms, than if had neither or just the coffee. She also adds that she and her  are cooking more at home and eating less packaged food.     Diet Recall for common diarrheal triggers:  Caffeine: when off of coffee was on energy drinks, when feeling well would have up to a pot of coffee a day, now is having about a half a pot of coffee, 1-2 times a week will get [place]  Artificial sugars/alcohol sugars: doesn't think uses much but hasn't checked labels at home, uses honey to briana coffee  Alcohol: none  Lactose: known intolerance to lactose since childhood, limited ingestion, generally hard cheeses for dairy, does not use Lactaid (has in the past), when doing well/feeling well - she will allow herself some lactose (I.e ice  "cream)    Current meds:  - Metamucil dose - no longer using daily, maybe 1-2 times a week. 2 tablespoons at a time  -Buspar - currently planned to stay at that dosing, feels really helpful for anxiety. On 5 mg TID - per her intended for GI symptoms. Does have a harder time taking it TID. Still on fluoxetine  - Loperamide, restarted after reintroduced coffee and stools got looser. Maybe using once a week. Will take 2 tabs, if still loose stools will another one.       **BRBPR  Taken/summarized from my earlier documentation:  She reports a past history of hemorrhoids during pregnancy experienced as pain, itching, and small BRBPR. At her initial GI visit she had reported more recent perianal pain, itching, and intermittent BRBPR. She felt this may be related to hemorrhoids and increased Metamucil with improvement.     As noted above, a colonoscopy was undertaken in Dec 2021. No source of BRBPR was seen on the exam.      Today, this is not an acute issue and she hasn't had these symptoms for some time.     ** Concern for GERD  At her initial GI visit, Ms. Grayson had described a new onset of solid food dysphagia and heartburn. EGD in Dec 2021 was endoscopically normal. Biopsies were NOT consistent with eosinophilic esophagitis, though a small amount of inflammation was seen on the distal esophagus biopsies suggestive of mild reflux.     Today, Ms. Grayson feels this is doing ok. She still has some periods of heartburn, but this hasn't been a significant issue. She continues to note a mild sense of food sticking and wonders if this is mostly sensed when she is paying attention to her swallow or eating too fast. She hasn't been taking anything for heartburn on a regular basis. She recalls a finding of \"erosive esophagitis\" on an EGD done years ago.    GI ROS:  No overt GI bleeding, weight is stable - no weight loss.     PERTINENT MEDICATIONS:  Current Outpatient Medications   Medication     albuterol (PROAIR HFA/PROVENTIL " "HFA/VENTOLIN HFA) 108 (90 Base) MCG/ACT inhaler     benzonatate (TESSALON) 100 MG capsule     busPIRone (BUSPAR) 5 MG tablet     FLUoxetine 20 MG tablet     No current facility-administered medications for this visit.         PHYSICAL EXAMINATION:  Vitals /84 (BP Location: Left arm, Cuff Size: Adult Regular)   Pulse 82   Ht 1.651 m (5' 5\")   Wt 72.9 kg (160 lb 11.2 oz)   LMP  (LMP Unknown)   SpO2 97%   BMI 26.74 kg/m     Wt   Wt Readings from Last 2 Encounters:   12/14/22 72.9 kg (160 lb 11.2 oz)   11/09/22 69.9 kg (154 lb)      Gen: Pt sitting up on exam table in NAD, interactive and cooperative on exam  Eyes: sclerae anicteric, no injection  Cardiac: RRR, nl S1, S2, no peripheral edema  Resp: Clear b/l  GI: Normoactive BS, abd soft, flat, nontender  Skin: Warm, dry, no jaundice, nails appear healthy  Neuro: alert, oriented, answers questions appropriately      PERTINENT STUDIES:    Lab Requisition on 12/02/2021   Component Date Value Ref Range Status     Case Report 12/02/2021    Final                    Value:Surgical Pathology Report                         Case: [id number]                                  Authorizing Provider:  Beatriz Moss         Collected:           12/02/2021 07:30 AM                                 MD Vianey                                                                Ordering Location:     [facility]     Received:            12/02/2021 02:41 PM                                 [facility]                                                       Pathologist:           Tyrel Domingo MD                                                             Specimens:   A) - Small Intestine, Duodenum, Duodenal biopsy                                                     B) - Stomach, Gastric biopsy                                                                        C) - Esophagus, Distal, Esophageal biopsy                                                        "    D) - Esophagus, Mid, Esophageal biopsy                                                                                        E) - Large Intestine, Colon, Ascending, Colon biopsy, ascending                                     F) - Large Intestine, Colon, Descending, Colon biopsy, descending                           Final Diagnosis 12/02/2021    Final                    Value:This result contains rich text formatting which cannot be displayed here.     Clinical Information 12/02/2021    Final                    Value:This result contains rich text formatting which cannot be displayed here.     Gross Description 12/02/2021    Final                    Value:This result contains rich text formatting which cannot be displayed here.     Microscopic Description 12/02/2021    Final                    Value:This result contains rich text formatting which cannot be displayed here.     Performing Labs 12/02/2021    Final                    Value:This result contains rich text formatting which cannot be displayed here.       Again, thank you for allowing me to participate in the care of your patient.      Sincerely,    Beatriz Moss MD

## 2022-12-14 NOTE — PATIENT INSTRUCTIONS
- please cut out all artificial sugars from your diet: Aspartame, Sucralose, Acesulfame K, Saccharin, Xylitol.  - continue to avoid lactose as you are  - consider having Lactaid with you and using before eating out or with [place]  - Increase Metamucil to daily use  - schedule a visit with our GI dietitian at your earliest convenience  - Increase Buspar to 10 mg twice daily, hopefully this will help with scheduling/remembering to take it during your busy day  - Consider taking loperamide/Immodium prophylactically if eating out, traveling, etc.  - consider cutting back on caffeine to 1-2 cups a day  - follow-up in 12-16 weeks     If you have any questions, please don't hesitate to contact me through our GI RN Clinic Coordinator, Pushpa Costa, at [phone].

## 2022-12-16 ENCOUNTER — TELEPHONE (OUTPATIENT)
Dept: GASTROENTEROLOGY | Facility: CLINIC | Age: 37
End: 2022-12-16

## 2022-12-16 NOTE — TELEPHONE ENCOUNTER
CHERRIE and sent [facility] OTR for scheduling Return in about 16 weeks (around 4/5/2023) for follow-up with Dr. Moss (ok for RAEGAN slot) or Dr. Jeronimo Delcid or any PA/NP.  Please also schedule visit with Tish Villarreal RD (dietitian).

## 2023-02-11 ENCOUNTER — HEALTH MAINTENANCE LETTER (OUTPATIENT)
Age: 38
End: 2023-02-11

## 2024-03-09 ENCOUNTER — HEALTH MAINTENANCE LETTER (OUTPATIENT)
Age: 39
End: 2024-03-09

## 2025-03-16 ENCOUNTER — HEALTH MAINTENANCE LETTER (OUTPATIENT)
Age: 40
End: 2025-03-16

## 2025-04-27 ENCOUNTER — HEALTH MAINTENANCE LETTER (OUTPATIENT)
Age: 40
End: 2025-04-27